# Patient Record
Sex: MALE | URBAN - METROPOLITAN AREA
[De-identification: names, ages, dates, MRNs, and addresses within clinical notes are randomized per-mention and may not be internally consistent; named-entity substitution may affect disease eponyms.]

---

## 2024-02-26 ENCOUNTER — LAB (OUTPATIENT)
Dept: URBAN - METROPOLITAN AREA CLINIC 105 | Facility: CLINIC | Age: 29
End: 2024-02-26

## 2024-02-26 ENCOUNTER — OV NP (OUTPATIENT)
Dept: URBAN - METROPOLITAN AREA CLINIC 105 | Facility: CLINIC | Age: 29
End: 2024-02-26
Payer: COMMERCIAL

## 2024-02-26 VITALS
SYSTOLIC BLOOD PRESSURE: 170 MMHG | TEMPERATURE: 98.1 F | HEART RATE: 87 BPM | DIASTOLIC BLOOD PRESSURE: 79 MMHG | BODY MASS INDEX: 28.59 KG/M2 | HEIGHT: 71 IN | RESPIRATION RATE: 20 BRPM | WEIGHT: 204.2 LBS

## 2024-02-26 DIAGNOSIS — K92.0 HEMATEMESIS WITH NAUSEA: ICD-10-CM

## 2024-02-26 DIAGNOSIS — R13.19 ESOPHAGEAL DYSPHAGIA: ICD-10-CM

## 2024-02-26 DIAGNOSIS — K21.9 GASTROESOPHAGEAL REFLUX DISEASE, UNSPECIFIED WHETHER ESOPHAGITIS PRESENT: ICD-10-CM

## 2024-02-26 PROBLEM — 235595009: Status: ACTIVE | Noted: 2024-02-26

## 2024-02-26 PROCEDURE — 99204 OFFICE O/P NEW MOD 45 MIN: CPT | Performed by: INTERNAL MEDICINE

## 2024-02-26 PROCEDURE — 99244 OFF/OP CNSLTJ NEW/EST MOD 40: CPT | Performed by: INTERNAL MEDICINE

## 2024-02-26 RX ORDER — OMEPRAZOLE 40 MG/1
1 CAPSULE 30 MINUTES BEFORE MORNING MEAL CAPSULE, DELAYED RELEASE ORAL ONCE A DAY
Qty: 60 | Refills: 0 | OUTPATIENT
Start: 2024-02-26

## 2024-02-26 NOTE — HPI-TODAY'S VISIT:
New patient 27 yo male with PMH of seasonal allergies referred by Dr. Juliana Rubio.   In 2020, he had COVID. He states ever since then he has had chronic dry cough, difficulty breathing and eating. ALl resolved except from dry cough.  September 2020 he saw his PCP and discovered he was allergic to dogs and was placed on Zyrtec and Flonase. It gave a suboptimal response. He was told to try Nexium for the cough and it resolved for 6 months but then it returned.  In 2021, he did Nexium again when it returned. He states the cough never returned after this. He started having dysphagia in 2021 with solid foods. Foods would get stuck in his mid-sternal area every 3 months, causing him to dry heave and regurgitate it back up. Now it occurs every 1-1 1/2 months with associated globus sensation and vomiting. Admits to one instance of hematemesis after eating grilled pork.  He will cut back on coffee, tomato-based foods and fatty foods for a week and this used to resolve his symptoms.  He drinks alcohol twice weekly. He states once a month he dominic have "a lot". He denies tobacco use. He will take Ibuprofen a few times a year. Denies melena and unintentional weight loss.

## 2024-03-27 ENCOUNTER — EGD (OUTPATIENT)
Dept: URBAN - METROPOLITAN AREA SURGERY CENTER 16 | Facility: SURGERY CENTER | Age: 29
End: 2024-03-27

## 2024-03-27 RX ORDER — OMEPRAZOLE 40 MG/1
1 CAPSULE 30 MINUTES BEFORE MORNING MEAL CAPSULE, DELAYED RELEASE ORAL ONCE A DAY
Qty: 60 | Refills: 0 | Status: ACTIVE | COMMUNITY
Start: 2024-02-26

## 2024-04-30 ENCOUNTER — OV EP (OUTPATIENT)
Dept: URBAN - METROPOLITAN AREA CLINIC 105 | Facility: CLINIC | Age: 29
End: 2024-04-30
Payer: COMMERCIAL

## 2024-04-30 VITALS
DIASTOLIC BLOOD PRESSURE: 78 MMHG | TEMPERATURE: 97.3 F | BODY MASS INDEX: 29.4 KG/M2 | HEIGHT: 71 IN | WEIGHT: 210 LBS | SYSTOLIC BLOOD PRESSURE: 132 MMHG | HEART RATE: 69 BPM

## 2024-04-30 DIAGNOSIS — Z83.719 FAMILY HISTORY OF COLONIC POLYPS: ICD-10-CM

## 2024-04-30 DIAGNOSIS — K21.9 GASTROESOPHAGEAL REFLUX DISEASE WITHOUT ESOPHAGITIS: ICD-10-CM

## 2024-04-30 DIAGNOSIS — R13.19 ESOPHAGEAL DYSPHAGIA: ICD-10-CM

## 2024-04-30 DIAGNOSIS — K44.9 HIATAL HERNIA: ICD-10-CM

## 2024-04-30 DIAGNOSIS — K29.60 EROSIVE GASTRITIS: ICD-10-CM

## 2024-04-30 DIAGNOSIS — K92.0 HEMATEMESIS WITH NAUSEA: ICD-10-CM

## 2024-04-30 PROBLEM — 1086791000119100: Status: ACTIVE | Noted: 2024-04-30

## 2024-04-30 PROBLEM — 266435005: Status: ACTIVE | Noted: 2024-04-30

## 2024-04-30 PROCEDURE — 99214 OFFICE O/P EST MOD 30 MIN: CPT | Performed by: INTERNAL MEDICINE

## 2024-04-30 RX ORDER — OMEPRAZOLE 20 MG/1
1 CAPSULE 30 MINUTES BEFORE MORNING MEAL CAPSULE, DELAYED RELEASE ORAL ONCE A DAY
Qty: 90 | Refills: 0 | OUTPATIENT
Start: 2024-02-26

## 2024-04-30 RX ORDER — OMEPRAZOLE 40 MG/1
1 CAPSULE 30 MINUTES BEFORE MORNING MEAL CAPSULE, DELAYED RELEASE ORAL ONCE A DAY
Qty: 60 | Refills: 0 | Status: ACTIVE | COMMUNITY
Start: 2024-02-26

## 2024-04-30 NOTE — HPI-TODAY'S VISIT:
The patient presents on follow-up He was previously seen by GUILLERMINA Thompson.  Today, he says he has bad breath 1x/month. He is on omeprazole 40 g daily since last visit in Feb. - cough and dysphagia resolved. He drinks 1 cold brew QD, alcohol 2-3 drinks/week. No carbonation. Dinner at 6:30-8, bed at 11.  FHx: Maternal grandfather - colon cancer, unknown age; Mother - colon polyps less than age 60

## 2024-08-07 ENCOUNTER — OFFICE VISIT (OUTPATIENT)
Dept: URBAN - METROPOLITAN AREA CLINIC 105 | Facility: CLINIC | Age: 29
End: 2024-08-07
Payer: COMMERCIAL

## 2024-08-07 ENCOUNTER — DASHBOARD ENCOUNTERS (OUTPATIENT)
Age: 29
End: 2024-08-07

## 2024-08-07 VITALS
HEART RATE: 65 BPM | BODY MASS INDEX: 29.34 KG/M2 | SYSTOLIC BLOOD PRESSURE: 138 MMHG | WEIGHT: 209.6 LBS | HEIGHT: 71 IN | DIASTOLIC BLOOD PRESSURE: 78 MMHG | TEMPERATURE: 97 F

## 2024-08-07 DIAGNOSIS — K44.9 HIATAL HERNIA: ICD-10-CM

## 2024-08-07 DIAGNOSIS — Z83.719 FAMILY HISTORY OF COLONIC POLYPS: ICD-10-CM

## 2024-08-07 DIAGNOSIS — K21.9 GASTROESOPHAGEAL REFLUX DISEASE WITHOUT ESOPHAGITIS: ICD-10-CM

## 2024-08-07 DIAGNOSIS — K29.60 EROSIVE GASTRITIS: ICD-10-CM

## 2024-08-07 DIAGNOSIS — R13.19 ESOPHAGEAL DYSPHAGIA: ICD-10-CM

## 2024-08-07 DIAGNOSIS — K92.0 HEMATEMESIS WITH NAUSEA: ICD-10-CM

## 2024-08-07 PROCEDURE — 99214 OFFICE O/P EST MOD 30 MIN: CPT | Performed by: INTERNAL MEDICINE

## 2024-08-07 RX ORDER — OMEPRAZOLE 20 MG/1
1 CAPSULE 30 MINUTES BEFORE MORNING MEAL CAPSULE, DELAYED RELEASE ORAL ONCE A DAY
Qty: 90 | Refills: 3 | OUTPATIENT
Start: 2024-02-26

## 2024-08-07 RX ORDER — FAMOTIDINE 40 MG/1
1 TABLET AT BEDTIME TABLET, FILM COATED ORAL ONCE A DAY
Qty: 90 TABLET | Refills: 3 | OUTPATIENT
Start: 2024-08-07

## 2024-08-07 RX ORDER — OMEPRAZOLE 20 MG/1
1 CAPSULE 30 MINUTES BEFORE MORNING MEAL CAPSULE, DELAYED RELEASE ORAL ONCE A DAY
Qty: 90 | Refills: 0 | Status: ACTIVE | COMMUNITY
Start: 2024-02-26

## 2024-08-07 NOTE — HPI-TODAY'S VISIT:
The patient presents on follow-up He was previously seen by GUILLERMINA Thompson.  On 4/30/24, he said he had bad breath 1x/month. He was on omeprazole 40 g daily since last visit in Feb. - cough and dysphagia resolved. He drank 1 cold brew QD, alcohol 2-3 drinks/week. No carbonation. Dinner at 6:30-8, bed at 11.  FHx: Maternal grandfather - colon cancer, unknown age; Mother - colon polyps less than age 60  HPI: Today, he says he is now on the decreased dose of omeprazole 20 mg QD. He now feels an occasional "taste and physical sensation" that reaches his throat when he eats spicy foods. He also has been losing his voice in the last few weeks which has been an issue previously when he had worsening reflux. No other symptoms.

## 2024-11-05 ENCOUNTER — OFFICE VISIT (OUTPATIENT)
Dept: URBAN - METROPOLITAN AREA CLINIC 105 | Facility: CLINIC | Age: 29
End: 2024-11-05
Payer: COMMERCIAL

## 2024-11-05 VITALS
WEIGHT: 213.6 LBS | BODY MASS INDEX: 29.9 KG/M2 | HEIGHT: 71 IN | TEMPERATURE: 97.5 F | DIASTOLIC BLOOD PRESSURE: 79 MMHG | HEART RATE: 55 BPM | SYSTOLIC BLOOD PRESSURE: 151 MMHG

## 2024-11-05 DIAGNOSIS — K44.9 HIATAL HERNIA: ICD-10-CM

## 2024-11-05 DIAGNOSIS — R13.19 ESOPHAGEAL DYSPHAGIA: ICD-10-CM

## 2024-11-05 DIAGNOSIS — Z83.719 FAMILY HISTORY OF COLONIC POLYPS: ICD-10-CM

## 2024-11-05 DIAGNOSIS — K29.60 EROSIVE GASTRITIS: ICD-10-CM

## 2024-11-05 DIAGNOSIS — K21.9 GASTROESOPHAGEAL REFLUX DISEASE WITHOUT ESOPHAGITIS: ICD-10-CM

## 2024-11-05 DIAGNOSIS — K92.0 HEMATEMESIS WITH NAUSEA: ICD-10-CM

## 2024-11-05 PROCEDURE — 99213 OFFICE O/P EST LOW 20 MIN: CPT | Performed by: INTERNAL MEDICINE

## 2024-11-05 RX ORDER — FAMOTIDINE 40 MG/1
1 TABLET AT BEDTIME TABLET, FILM COATED ORAL ONCE A DAY
Qty: 90 TABLET | Refills: 3 | Status: ACTIVE | COMMUNITY
Start: 2024-08-07

## 2024-11-05 RX ORDER — OMEPRAZOLE 20 MG/1
1 CAPSULE 30 MINUTES BEFORE MORNING MEAL CAPSULE, DELAYED RELEASE ORAL ONCE A DAY
Qty: 90 | Refills: 3 | Status: ACTIVE | COMMUNITY
Start: 2024-02-26

## 2024-11-05 NOTE — HPI-TODAY'S VISIT:
The patient presents on follow-up He was previously seen by GUILLERMINA Thompson.  On 4/30/24, he said he had bad breath 1x/month. He was on omeprazole 40 g daily since last visit in Feb. - cough and dysphagia resolved. He drank 1 cold brew QD, alcohol 2-3 drinks/week. No carbonation. Dinner at 6:30-8, bed at 11.  FHx: Maternal grandfather - colon cancer, unknown age; Mother - colon polyps less than age 60  On 8/7/24, he said he was now on the decreased dose of omeprazole 20 mg QD. He now felt an occasional "taste and physical sensation" that reached his throat when he ate spicy foods. He also had been losing his voice in the last few weeks which had been an issue previously when he had worsening reflux. No other symptoms.  HPI Today, he says he has started on famotidine 40 mg QHS and continues on omeprazole 20 mg QD. He does note a benefit with his voice since adding in famotidine - still loses voice, but no longer after an hour of talking. No reflux symptoms after a larger meal. He now drinks coffee less than 1x/week and now eats breakfast.

## 2025-05-07 ENCOUNTER — OFFICE VISIT (OUTPATIENT)
Dept: URBAN - METROPOLITAN AREA CLINIC 105 | Facility: CLINIC | Age: 30
End: 2025-05-07

## 2025-05-27 ENCOUNTER — OFFICE VISIT (OUTPATIENT)
Dept: URBAN - METROPOLITAN AREA CLINIC 105 | Facility: CLINIC | Age: 30
End: 2025-05-27
Payer: COMMERCIAL

## 2025-05-27 DIAGNOSIS — K44.9 HIATAL HERNIA: ICD-10-CM

## 2025-05-27 DIAGNOSIS — K92.0 HEMATEMESIS WITH NAUSEA: ICD-10-CM

## 2025-05-27 DIAGNOSIS — K29.60 EROSIVE GASTRITIS: ICD-10-CM

## 2025-05-27 DIAGNOSIS — R13.19 ESOPHAGEAL DYSPHAGIA: ICD-10-CM

## 2025-05-27 DIAGNOSIS — Z83.719 FAMILY HISTORY OF COLONIC POLYPS: ICD-10-CM

## 2025-05-27 DIAGNOSIS — K21.9 GASTROESOPHAGEAL REFLUX DISEASE WITHOUT ESOPHAGITIS: ICD-10-CM

## 2025-05-27 PROCEDURE — 99214 OFFICE O/P EST MOD 30 MIN: CPT | Performed by: INTERNAL MEDICINE

## 2025-05-27 RX ORDER — FAMOTIDINE 40 MG/1
1 TABLET AT BEDTIME TABLET, FILM COATED ORAL ONCE A DAY
Qty: 90 TABLET | Refills: 3 | Status: ACTIVE | COMMUNITY
Start: 2024-08-07

## 2025-05-27 RX ORDER — OMEPRAZOLE 20 MG/1
1 CAPSULE 30 MINUTES BEFORE MORNING MEAL CAPSULE, DELAYED RELEASE ORAL ONCE A DAY
Qty: 90 | Refills: 3 | Status: ACTIVE | COMMUNITY
Start: 2024-02-26

## 2025-05-27 RX ORDER — OMEPRAZOLE 10 MG/1
1 CAPSULE 30 MINUTES BEFORE MORNING MEAL CAPSULE, DELAYED RELEASE ORAL ONCE A DAY
Qty: 90 | Refills: 3 | OUTPATIENT
Start: 2024-02-26

## 2025-05-27 NOTE — HPI-TODAY'S VISIT:
The patient presents on follow-up He was previously seen by GUILLERMINA Thompson.  On 4/30/24, he said he had bad breath 1x/month. He was on omeprazole 40 g daily since last visit in Feb. - cough and dysphagia resolved. He drank 1 cold brew QD, alcohol 2-3 drinks/week. No carbonation. Dinner at 6:30-8, bed at 11.  FHx: Maternal grandfather - colon cancer, unknown age; Mother - colon polyps less than age 60  On 8/7/24, he said he was now on the decreased dose of omeprazole 20 mg QD. He now felt an occasional "taste and physical sensation" that reached his throat when he ate spicy foods. He also had been losing his voice in the last few weeks which had been an issue previously when he had worsening reflux. No other symptoms.  On 11/5/24, he said he had started on famotidine 40 mg QHS and continued on omeprazole 20 mg QD. He did note a benefit with his voice since adding in famotidine - still lost voice, but no longer after an hour of talking. No reflux symptoms after a larger meal. He now drank coffee less than 1x/week and now ate breakfast.  HPI Today, he says he continues on omeprazole 20 mg QD and famotidine 40 mg QHS. Voice loss has now stabilized. He says he is not required to talk as much as his new job. No indigestion, soreness, or other reflux symptoms. He has occasional bad breath that usually happens after a night of overeating. No recurrent dysphagia.

## 2025-07-25 ENCOUNTER — ERX REFILL RESPONSE (OUTPATIENT)
Dept: URBAN - METROPOLITAN AREA CLINIC 105 | Facility: CLINIC | Age: 30
End: 2025-07-25

## 2025-07-25 RX ORDER — FAMOTIDINE 40 MG/1
TAKE 1 TABLET BY MOUTH EVERY DAY AT BEDTIME FOR 90 DAYS TABLET, FILM COATED ORAL
Qty: 90 TABLET | Refills: 3

## 2025-07-25 RX ORDER — OMEPRAZOLE 10 MG/1
1 CAPSULE 30 MINUTES BEFORE MORNING MEAL CAPSULE, DELAYED RELEASE ORAL ONCE A DAY
Qty: 90 | Refills: 3